# Patient Record
Sex: MALE | Race: WHITE
[De-identification: names, ages, dates, MRNs, and addresses within clinical notes are randomized per-mention and may not be internally consistent; named-entity substitution may affect disease eponyms.]

---

## 2019-10-09 ENCOUNTER — HOSPITAL ENCOUNTER (OUTPATIENT)
Dept: HOSPITAL 75 - PREOP | Age: 64
Discharge: HOME | End: 2019-10-09
Attending: SURGERY
Payer: MEDICARE

## 2019-10-09 VITALS — BODY MASS INDEX: 27.65 KG/M2 | HEIGHT: 67.72 IN | WEIGHT: 180.34 LBS

## 2019-10-09 DIAGNOSIS — Z01.818: Primary | ICD-10-CM

## 2019-10-16 ENCOUNTER — HOSPITAL ENCOUNTER (OUTPATIENT)
Dept: HOSPITAL 75 - ENDO | Age: 64
End: 2019-10-16
Attending: SURGERY
Payer: MEDICARE

## 2019-10-16 VITALS — HEIGHT: 67.99 IN | BODY MASS INDEX: 27.33 KG/M2 | WEIGHT: 180.34 LBS

## 2019-10-16 VITALS — SYSTOLIC BLOOD PRESSURE: 130 MMHG | DIASTOLIC BLOOD PRESSURE: 72 MMHG

## 2019-10-16 VITALS — DIASTOLIC BLOOD PRESSURE: 75 MMHG | SYSTOLIC BLOOD PRESSURE: 120 MMHG

## 2019-10-16 VITALS — SYSTOLIC BLOOD PRESSURE: 116 MMHG | DIASTOLIC BLOOD PRESSURE: 70 MMHG

## 2019-10-16 VITALS — SYSTOLIC BLOOD PRESSURE: 119 MMHG | DIASTOLIC BLOOD PRESSURE: 76 MMHG

## 2019-10-16 VITALS — DIASTOLIC BLOOD PRESSURE: 94 MMHG | SYSTOLIC BLOOD PRESSURE: 139 MMHG

## 2019-10-16 VITALS — DIASTOLIC BLOOD PRESSURE: 79 MMHG | SYSTOLIC BLOOD PRESSURE: 125 MMHG

## 2019-10-16 VITALS — SYSTOLIC BLOOD PRESSURE: 129 MMHG | DIASTOLIC BLOOD PRESSURE: 79 MMHG

## 2019-10-16 VITALS — SYSTOLIC BLOOD PRESSURE: 119 MMHG | DIASTOLIC BLOOD PRESSURE: 67 MMHG

## 2019-10-16 VITALS — DIASTOLIC BLOOD PRESSURE: 69 MMHG | SYSTOLIC BLOOD PRESSURE: 122 MMHG

## 2019-10-16 DIAGNOSIS — E78.00: ICD-10-CM

## 2019-10-16 DIAGNOSIS — Z79.899: ICD-10-CM

## 2019-10-16 DIAGNOSIS — Z12.11: Primary | ICD-10-CM

## 2019-10-16 DIAGNOSIS — M54.9: ICD-10-CM

## 2019-10-16 DIAGNOSIS — Z79.82: ICD-10-CM

## 2019-10-16 DIAGNOSIS — K64.0: ICD-10-CM

## 2019-10-16 DIAGNOSIS — Z82.49: ICD-10-CM

## 2019-10-16 DIAGNOSIS — G89.29: ICD-10-CM

## 2019-10-16 DIAGNOSIS — K64.8: ICD-10-CM

## 2019-10-16 DIAGNOSIS — Z80.1: ICD-10-CM

## 2019-10-16 DIAGNOSIS — Z87.891: ICD-10-CM

## 2019-10-16 RX ADMIN — MIDAZOLAM PRN MG: 1 INJECTION INTRAMUSCULAR; INTRAVENOUS at 10:31

## 2019-10-16 RX ADMIN — MIDAZOLAM PRN MG: 1 INJECTION INTRAMUSCULAR; INTRAVENOUS at 10:33

## 2019-10-16 RX ADMIN — MIDAZOLAM PRN MG: 1 INJECTION INTRAMUSCULAR; INTRAVENOUS at 10:41

## 2019-10-16 NOTE — PROGRESS NOTE-PRE OPERATIVE
Pre-Operative Progress Note


H&P Reviewed


The H&P was reviewed, patient examined and no changes noted.


Date Seen by Provider:  Oct 16, 2019


Time Seen by Provider:  09:00


Date H&P Reviewed:  Oct 16, 2019


Time H&P Reviewed:  09:00


Pre-Operative Diagnosis:  HEATHER Monroy MD                Oct 16, 2019 09:32

## 2019-10-16 NOTE — OPERATIVE REPORT
DATE OF SERVICE:  10/16/2019



ATTENDING PRIMARY CARE PHYSICIAN:

Roseann James DO



PREOPERATIVE DIAGNOSIS:

Screening colonoscopy.



POSTOPERATIVE DIAGNOSIS:

Mild chronic stage I external and internal hemorrhoids, remainder of the rectum

and colon were normal.



PROCEDURE PERFORMED:

Colonoscopy.



SURGEON:

Heather Alegre MD.



ANESTHESIA:

Conscious sedation.



ESTIMATED BLOOD LOSS:

Minimal.



FINDINGS:

Mild chronic stage I external and internal hemorrhoids, remainder of the rectum

and colon were normal.



DISPOSITION:

The patient tolerated the procedure well.



INDICATIONS:

The patient is a 64-year-old male in need of a screening colonoscopy.  His last

colonoscopy was approximately 10 years ago and he believes this to be normal. 

He states that he is otherwise doing well, does not report any major issues with

diarrhea nor constipation as well as no red blood per rectum nor any dark tarry

stools.  He also does not report any family history of colon cancer.



DESCRIPTION OF PROCEDURE:

The patient was brought to the endoscopy suite, laid in the left lateral

decubitus position.  After adequate IV pain and sedative medications and

conscious sedation anesthesia, a digital rectal examination was performed.  Mild

chronic stage I external and internal hemorrhoids were identified, which were

not actively edematous nor inflamed and no bleeding.  Normal sphincter tone was

felt and there were no palpable masses.  Prostate gland was palpable and

appeared normal.



The endoscope was then intubated into the anus and rectum gently insufflated. 

The endoscope was then advanced through the valves of [place] of the rectum with

no polyps or any neoplasms identified.  We then proceeded through the sigmoid

colon where no diverticulosis identified.  The endoscope was then advanced

through the remainder of the descending, transverse and ascending colon to the

cecum.  These segments were normal.  There were no polyps nor any neoplasms

identified throughout the colon or rectum.  Endoscope was then slowly withdrawn

while taking a second look and suctioning of residual air with no additional

findings.



The patient tolerated the procedure well.  We will recommend continued medical

management with a high fiber diet with 30 grams of fiber daily as well as

significant amounts of water to promote soft stools on a daily basis.  He does

not need another colonoscopy for another 10 years.





Job ID: [id number]

DocumentID: [id number]

Dictated Date:  10/16/2019 10:56:07

Transcription Date: 10/16/2019 15:15:25

Dictated By: HEATHER ALEGRE MD

## 2019-10-16 NOTE — DISCHARGE INST-SURGICAL
D/C Lap Instructions-CODEY


Follow Up 





Activity as tolerated








High Fiber Diet 25g or more per day





Avoid Alcohol, Caffeine, Spicy Greasy and Acid foods.





Drink 64 fluid oz or more of fluids per day.





Symptoms to Report: Fever over 101 degree F, Nausea/Vomiting 


If any problems/questions: Contact your physician or go to Emergency Room











HEATHER ALEGRE MD                Oct 16, 2019 09:33

## 2019-10-16 NOTE — CONSCIOUS SEDATION/ASA
Conscious Sedation Pre-Proced


Time


09:00





ASA Score


2


For ASA 3 and 4: Consider anesthesia and medical clearance. Also, for patients

with a history of failed moderate sedation consider anesthesia.

















Airway 


 


Lungs 


 


Heart 


 


 ASA score


 


 ASA 1: a normal healthy patient


 


 ASA 2:  a patient with a mild systemic disease (mid diabetes, controlled 

hypertension, obesity 


 


 ASA 3:  a patient with a severe systemic disease that limits activity  (angina,

COPD, prior Myocardial infarction)


 


 ASA 4:  a patient with an incapacitating disease that is a constant threat to 

life (CHF, renal failure)


 


 ASA 5:  a moribund patient not expected to survive 24 hrs.  (ruptured aneurysm)


 


 ASA 6:  a declared brain-dead patient whose organs are being harvested.


 


 For emergent operations, add the letter E after the classification











Mallampati Classification


Grade 2





Sedation Plan


Analgesia, Amnesia, Plan communicated to team members, Discussed options with 

patient/fam, Discussed risks with patient/fam


The patient is an appropriate candidate to undergo the planned procedure, 

sedation, and anesthesia.





The patient immediately re-assessed prior to indication.











HEATHER ALEGRE MD                Oct 16, 2019 09:31

## 2021-11-03 ENCOUNTER — HOSPITAL ENCOUNTER (OUTPATIENT)
Dept: HOSPITAL 75 - CARD | Age: 66
End: 2021-11-03
Attending: FAMILY MEDICINE
Payer: MEDICARE

## 2021-11-03 DIAGNOSIS — R00.1: Primary | ICD-10-CM

## 2021-11-03 PROCEDURE — 93225 XTRNL ECG REC<48 HRS REC: CPT

## 2021-11-03 PROCEDURE — 93226 XTRNL ECG REC<48 HR SCAN A/R: CPT

## 2021-11-08 NOTE — HOLTER MONITOR
HOLTER MONITOR


DATE OF PROCEDURE: 11/03/2021.





INDICATION: Bradycardia.





PROCEDURE: A 24-hour Holter monitor was obtained for a total of 24 hours. The 

study quality is adequate.





RESULTS:


1.  Baseline sinus bradycardia with an average heart rate of 59 bpm, ranging 

from 46-94 bpm.


2.  There were rare (26), isolated premature supraventricular complexes.


3.  There were occasional (50), isolated premature ventricular complexes and one

ventricular triplet (nonsustained ventricular tachycardia).


4.  There were no pauses exceeding 2 seconds in duration.


5.  There was one cardiac symptom reported in the patient diary that correlated 

to sinus bradycardia at a heart rate of 55 bpm with no arrhythmias.





IMPRESSION:


1.  This is a 24-hour Holter monitor report.


2.  Baseline sinus bradycardia with an average heart rate of 59 bpm, ranging 

from 46-94 bpm with rare, isolated premature supraventricular complexes, 

occasional, isolated premature ventricular complexes and one ventricular triplet

(nonsustained ventricular tachycardia).


3.  There was one cardiac symptom reported in the patient diary that correlated 

to sinus bradycardia at a heart rate of 55 bpm with no arrhythmias.





Certain portions of this document may have been dictated utilizing voice danelle

gnition technology.  Inherent to this technology, typographical and grammatical 

errors may exist.  As much as I am diligent to identify and correct these 

mistakes, some errors may remain in the document.











MITCHELL DE LEÓN JR, MD          Nov 8, 2021 12:14